# Patient Record
Sex: MALE | Race: BLACK OR AFRICAN AMERICAN | NOT HISPANIC OR LATINO | URBAN - METROPOLITAN AREA
[De-identification: names, ages, dates, MRNs, and addresses within clinical notes are randomized per-mention and may not be internally consistent; named-entity substitution may affect disease eponyms.]

---

## 2024-01-01 ENCOUNTER — APPOINTMENT (OUTPATIENT)
Dept: RADIOLOGY | Facility: HOSPITAL | Age: 69
End: 2024-01-01
Payer: MEDICARE

## 2024-01-01 ENCOUNTER — HOSPITAL ENCOUNTER (EMERGENCY)
Facility: HOSPITAL | Age: 69
End: 2024-09-16
Attending: STUDENT IN AN ORGANIZED HEALTH CARE EDUCATION/TRAINING PROGRAM
Payer: MEDICARE

## 2024-01-01 VITALS
RESPIRATION RATE: 20 BRPM | DIASTOLIC BLOOD PRESSURE: 36 MMHG | OXYGEN SATURATION: 41 % | WEIGHT: 112.43 LBS | HEART RATE: 118 BPM | SYSTOLIC BLOOD PRESSURE: 129 MMHG

## 2024-01-01 DIAGNOSIS — I46.9 CARDIAC ARREST: Primary | ICD-10-CM

## 2024-01-01 LAB
ABO GROUP (TYPE) IN BLOOD: NORMAL
ALBUMIN SERPL BCP-MCNC: 4 G/DL (ref 3.4–5)
ALP SERPL-CCNC: 89 U/L (ref 33–136)
ALT SERPL W P-5'-P-CCNC: 55 U/L (ref 10–52)
ANION GAP BLDV CALCULATED.4IONS-SCNC: 17 MMOL/L (ref 10–25)
ANION GAP BLDV CALCULATED.4IONS-SCNC: 26 MMOL/L (ref 10–25)
ANION GAP BLDV CALCULATED.4IONS-SCNC: 32 MMOL/L (ref 10–25)
ANION GAP SERPL CALC-SCNC: 38 MMOL/L (ref 10–20)
ANTIBODY SCREEN: NORMAL
AST SERPL W P-5'-P-CCNC: 70 U/L (ref 9–39)
BASE EXCESS BLDV CALC-SCNC: -14.1 MMOL/L (ref -2–3)
BASE EXCESS BLDV CALC-SCNC: -15.3 MMOL/L (ref -2–3)
BASE EXCESS BLDV CALC-SCNC: -23.1 MMOL/L (ref -2–3)
BASOPHILS # BLD AUTO: 0.01 X10*3/UL (ref 0–0.1)
BASOPHILS NFR BLD AUTO: 0.2 %
BILIRUB SERPL-MCNC: 1.1 MG/DL (ref 0–1.2)
BODY TEMPERATURE: 37 DEGREES CELSIUS
BUN SERPL-MCNC: 61 MG/DL (ref 6–23)
CA-I BLDV-SCNC: 0.75 MMOL/L (ref 1.1–1.33)
CA-I BLDV-SCNC: 1.06 MMOL/L (ref 1.1–1.33)
CA-I BLDV-SCNC: 1.79 MMOL/L (ref 1.1–1.33)
CALCIUM SERPL-MCNC: 10.4 MG/DL (ref 8.6–10.6)
CARDIAC TROPONIN I PNL SERPL HS: 435 NG/L (ref 0–53)
CHLORIDE BLDV-SCNC: 107 MMOL/L (ref 98–107)
CHLORIDE BLDV-SCNC: 112 MMOL/L (ref 98–107)
CHLORIDE BLDV-SCNC: 115 MMOL/L (ref 98–107)
CHLORIDE SERPL-SCNC: 112 MMOL/L (ref 98–107)
CO2 SERPL-SCNC: 4 MMOL/L (ref 21–32)
CREAT SERPL-MCNC: 2.28 MG/DL (ref 0.5–1.3)
EGFRCR SERPLBLD CKD-EPI 2021: 30 ML/MIN/1.73M*2
EOSINOPHIL # BLD AUTO: 0 X10*3/UL (ref 0–0.7)
EOSINOPHIL NFR BLD AUTO: 0 %
ERYTHROCYTE [DISTWIDTH] IN BLOOD BY AUTOMATED COUNT: 14.1 % (ref 11.5–14.5)
GLUCOSE BLDV-MCNC: 361 MG/DL (ref 74–99)
GLUCOSE BLDV-MCNC: 507 MG/DL (ref 74–99)
GLUCOSE BLDV-MCNC: 81 MG/DL (ref 74–99)
GLUCOSE SERPL-MCNC: 64 MG/DL (ref 74–99)
HCO3 BLDV-SCNC: 15.4 MMOL/L (ref 22–26)
HCO3 BLDV-SCNC: 4.4 MMOL/L (ref 22–26)
HCO3 BLDV-SCNC: 6.2 MMOL/L (ref 22–26)
HCT VFR BLD AUTO: 48.8 % (ref 41–52)
HCT VFR BLD EST: 28 % (ref 41–52)
HCT VFR BLD EST: 54 % (ref 41–52)
HCT VFR BLD EST: 63 % (ref 41–52)
HGB BLD-MCNC: 16.6 G/DL (ref 13.5–17.5)
HGB BLDV-MCNC: 18 G/DL (ref 13.5–17.5)
HGB BLDV-MCNC: 21 G/DL (ref 13.5–17.5)
HGB BLDV-MCNC: 9.4 G/DL (ref 13.5–17.5)
IMM GRANULOCYTES # BLD AUTO: 0.03 X10*3/UL (ref 0–0.7)
IMM GRANULOCYTES NFR BLD AUTO: 0.7 % (ref 0–0.9)
LACTATE BLDV-SCNC: 11.4 MMOL/L (ref 0.4–2)
LACTATE BLDV-SCNC: 11.5 MMOL/L (ref 0.4–2)
LACTATE BLDV-SCNC: 4 MMOL/L (ref 0.4–2)
LACTATE SERPL-SCNC: 12 MMOL/L (ref 0.4–2)
LYMPHOCYTES # BLD AUTO: 2.7 X10*3/UL (ref 1.2–4.8)
LYMPHOCYTES NFR BLD AUTO: 58.7 %
MAGNESIUM SERPL-MCNC: 3.25 MG/DL (ref 1.6–2.4)
MCH RBC QN AUTO: 32 PG (ref 26–34)
MCHC RBC AUTO-ENTMCNC: 34 G/DL (ref 32–36)
MCV RBC AUTO: 94 FL (ref 80–100)
MONOCYTES # BLD AUTO: 0.2 X10*3/UL (ref 0.1–1)
MONOCYTES NFR BLD AUTO: 4.3 %
NEUTROPHILS # BLD AUTO: 1.66 X10*3/UL (ref 1.2–7.7)
NEUTROPHILS NFR BLD AUTO: 36.1 %
NRBC BLD-RTO: 0.7 /100 WBCS (ref 0–0)
OXYHGB MFR BLDV: 27.8 % (ref 45–75)
OXYHGB MFR BLDV: 79.7 % (ref 45–75)
OXYHGB MFR BLDV: 97.4 % (ref 45–75)
PCO2 BLDV: 24 MM HG (ref 41–51)
PCO2 BLDV: 53 MM HG (ref 41–51)
PCO2 BLDV: 7 MM HG (ref 41–51)
PH BLDV: 7.02 PH (ref 7.33–7.43)
PH BLDV: 7.07 PH (ref 7.33–7.43)
PH BLDV: 7.41 PH (ref 7.33–7.43)
PLATELET # BLD AUTO: 99 X10*3/UL (ref 150–450)
PO2 BLDV: 249 MM HG (ref 35–45)
PO2 BLDV: 36 MM HG (ref 35–45)
PO2 BLDV: 66 MM HG (ref 35–45)
POTASSIUM BLDV-SCNC: 2.8 MMOL/L (ref 3.5–5.3)
POTASSIUM BLDV-SCNC: 4.9 MMOL/L (ref 3.5–5.3)
POTASSIUM BLDV-SCNC: 7.1 MMOL/L (ref 3.5–5.3)
POTASSIUM SERPL-SCNC: 6.9 MMOL/L (ref 3.5–5.3)
PROT SERPL-MCNC: 7.1 G/DL (ref 6.4–8.2)
RBC # BLD AUTO: 5.18 X10*6/UL (ref 4.5–5.9)
RH FACTOR (ANTIGEN D): NORMAL
SAO2 % BLDV: 100 % (ref 45–75)
SAO2 % BLDV: 28 % (ref 45–75)
SAO2 % BLDV: 82 % (ref 45–75)
SODIUM BLDV-SCNC: 135 MMOL/L (ref 136–145)
SODIUM BLDV-SCNC: 141 MMOL/L (ref 136–145)
SODIUM BLDV-SCNC: 143 MMOL/L (ref 136–145)
SODIUM SERPL-SCNC: 147 MMOL/L (ref 136–145)
WBC # BLD AUTO: 4.6 X10*3/UL (ref 4.4–11.3)

## 2024-01-01 PROCEDURE — 71045 X-RAY EXAM CHEST 1 VIEW: CPT | Performed by: RADIOLOGY

## 2024-01-01 PROCEDURE — 36556 INSERT NON-TUNNEL CV CATH: CPT

## 2024-01-01 PROCEDURE — 36556 INSERT NON-TUNNEL CV CATH: CPT | Performed by: STUDENT IN AN ORGANIZED HEALTH CARE EDUCATION/TRAINING PROGRAM

## 2024-01-01 PROCEDURE — 71045 X-RAY EXAM CHEST 1 VIEW: CPT

## 2024-01-01 PROCEDURE — 83605 ASSAY OF LACTIC ACID: CPT | Performed by: STUDENT IN AN ORGANIZED HEALTH CARE EDUCATION/TRAINING PROGRAM

## 2024-01-01 PROCEDURE — 93010 ELECTROCARDIOGRAM REPORT: CPT | Performed by: STUDENT IN AN ORGANIZED HEALTH CARE EDUCATION/TRAINING PROGRAM

## 2024-01-01 PROCEDURE — 36620 INSERTION CATHETER ARTERY: CPT

## 2024-01-01 PROCEDURE — 2500000004 HC RX 250 GENERAL PHARMACY W/ HCPCS (ALT 636 FOR OP/ED)

## 2024-01-01 PROCEDURE — 85025 COMPLETE CBC W/AUTO DIFF WBC: CPT | Performed by: STUDENT IN AN ORGANIZED HEALTH CARE EDUCATION/TRAINING PROGRAM

## 2024-01-01 PROCEDURE — 84132 ASSAY OF SERUM POTASSIUM: CPT

## 2024-01-01 PROCEDURE — 84132 ASSAY OF SERUM POTASSIUM: CPT | Performed by: STUDENT IN AN ORGANIZED HEALTH CARE EDUCATION/TRAINING PROGRAM

## 2024-01-01 PROCEDURE — 94002 VENT MGMT INPAT INIT DAY: CPT | Mod: CCI

## 2024-01-01 PROCEDURE — 36415 COLL VENOUS BLD VENIPUNCTURE: CPT | Performed by: STUDENT IN AN ORGANIZED HEALTH CARE EDUCATION/TRAINING PROGRAM

## 2024-01-01 PROCEDURE — 86901 BLOOD TYPING SEROLOGIC RH(D): CPT | Performed by: STUDENT IN AN ORGANIZED HEALTH CARE EDUCATION/TRAINING PROGRAM

## 2024-01-01 PROCEDURE — 2500000002 HC RX 250 W HCPCS SELF ADMINISTERED DRUGS (ALT 637 FOR MEDICARE OP, ALT 636 FOR OP/ED)

## 2024-01-01 PROCEDURE — 99291 CRITICAL CARE FIRST HOUR: CPT | Performed by: STUDENT IN AN ORGANIZED HEALTH CARE EDUCATION/TRAINING PROGRAM

## 2024-01-01 PROCEDURE — 2500000004 HC RX 250 GENERAL PHARMACY W/ HCPCS (ALT 636 FOR OP/ED): Performed by: STUDENT IN AN ORGANIZED HEALTH CARE EDUCATION/TRAINING PROGRAM

## 2024-01-01 PROCEDURE — 31500 INSERT EMERGENCY AIRWAY: CPT | Performed by: STUDENT IN AN ORGANIZED HEALTH CARE EDUCATION/TRAINING PROGRAM

## 2024-01-01 PROCEDURE — 92950 HEART/LUNG RESUSCITATION CPR: CPT

## 2024-01-01 PROCEDURE — 31500 INSERT EMERGENCY AIRWAY: CPT

## 2024-01-01 PROCEDURE — 84484 ASSAY OF TROPONIN QUANT: CPT | Performed by: STUDENT IN AN ORGANIZED HEALTH CARE EDUCATION/TRAINING PROGRAM

## 2024-01-01 PROCEDURE — 99285 EMERGENCY DEPT VISIT HI MDM: CPT | Performed by: STUDENT IN AN ORGANIZED HEALTH CARE EDUCATION/TRAINING PROGRAM

## 2024-01-01 PROCEDURE — 83735 ASSAY OF MAGNESIUM: CPT | Performed by: STUDENT IN AN ORGANIZED HEALTH CARE EDUCATION/TRAINING PROGRAM

## 2024-01-01 PROCEDURE — 2500000005 HC RX 250 GENERAL PHARMACY W/O HCPCS: Performed by: STUDENT IN AN ORGANIZED HEALTH CARE EDUCATION/TRAINING PROGRAM

## 2024-01-01 PROCEDURE — 92950 HEART/LUNG RESUSCITATION CPR: CPT | Performed by: STUDENT IN AN ORGANIZED HEALTH CARE EDUCATION/TRAINING PROGRAM

## 2024-01-01 PROCEDURE — 36680 INSERT NEEDLE BONE CAVITY: CPT

## 2024-01-01 PROCEDURE — 2500000005 HC RX 250 GENERAL PHARMACY W/O HCPCS

## 2024-01-01 RX ORDER — NOREPINEPHRINE BITARTRATE/D5W 8 MG/250ML
.01-1.5 PLASTIC BAG, INJECTION (ML) INTRAVENOUS CONTINUOUS
Status: DISCONTINUED | OUTPATIENT
Start: 2024-01-01 | End: 2024-01-01 | Stop reason: HOSPADM

## 2024-01-01 RX ORDER — FENTANYL CITRATE-0.9 % NACL/PF 10 MCG/ML
PLASTIC BAG, INJECTION (ML) INTRAVENOUS
Status: COMPLETED
Start: 2024-01-01 | End: 2024-01-01

## 2024-01-01 RX ORDER — EPINEPHRINE 0.1 MG/ML
INJECTION INTRACARDIAC; INTRAVENOUS CODE/TRAUMA/SEDATION MEDICATION
Status: COMPLETED | OUTPATIENT
Start: 2024-01-01 | End: 2024-01-01

## 2024-01-01 RX ORDER — AMIODARONE HYDROCHLORIDE 150 MG/3ML
INJECTION, SOLUTION INTRAVENOUS CODE/TRAUMA/SEDATION MEDICATION
Status: COMPLETED | OUTPATIENT
Start: 2024-01-01 | End: 2024-01-01

## 2024-01-01 RX ORDER — CALCIUM CHLORIDE INJECTION 100 MG/ML
INJECTION, SOLUTION INTRAVENOUS CODE/TRAUMA/SEDATION MEDICATION
Status: COMPLETED | OUTPATIENT
Start: 2024-01-01 | End: 2024-01-01

## 2024-01-01 RX ORDER — MIDAZOLAM HYDROCHLORIDE 1 MG/ML
INJECTION INTRAMUSCULAR; INTRAVENOUS
Status: COMPLETED
Start: 2024-01-01 | End: 2024-01-01

## 2024-01-01 RX ORDER — ATROPINE SULFATE 0.4 MG/ML
1 INJECTION, SOLUTION ENDOTRACHEAL; INTRAMEDULLARY; INTRAMUSCULAR; INTRAVENOUS; SUBCUTANEOUS ONCE
Status: COMPLETED | OUTPATIENT
Start: 2024-01-01 | End: 2024-01-01

## 2024-01-01 RX ORDER — INDOMETHACIN 25 MG/1
CAPSULE ORAL CODE/TRAUMA/SEDATION MEDICATION
Status: COMPLETED | OUTPATIENT
Start: 2024-01-01 | End: 2024-01-01

## 2024-01-01 RX ORDER — DEXTROSE 50 % IN WATER (D50W) INTRAVENOUS SYRINGE
CODE/TRAUMA/SEDATION MEDICATION
Status: COMPLETED | OUTPATIENT
Start: 2024-01-01 | End: 2024-01-01

## 2024-01-01 RX ORDER — MAGNESIUM SULFATE HEPTAHYDRATE 40 MG/ML
2 INJECTION, SOLUTION INTRAVENOUS ONCE
Status: COMPLETED | OUTPATIENT
Start: 2024-01-01 | End: 2024-01-01

## 2024-01-01 RX ORDER — MIDAZOLAM HYDROCHLORIDE 1 MG/ML
2 INJECTION INTRAMUSCULAR; INTRAVENOUS EVERY 2 HOUR PRN
Status: DISCONTINUED | OUTPATIENT
Start: 2024-01-01 | End: 2024-01-01 | Stop reason: HOSPADM

## 2024-01-01 RX ORDER — FENTANYL CITRATE-0.9 % NACL/PF 10 MCG/ML
0-300 PLASTIC BAG, INJECTION (ML) INTRAVENOUS CONTINUOUS
Status: DISCONTINUED | OUTPATIENT
Start: 2024-01-01 | End: 2024-01-01 | Stop reason: HOSPADM

## 2024-09-16 NOTE — ED PROVIDER NOTES
Emergency Department Provider Note        History of Present Illness   History provided by: EMS  Limitations to History: Cardiac Arrest    HPI:  Duane Harlston is a 69 y.o. male who is brought in by EMS in respiratory distress. Bag valve mask was applied on arrival. Patient was found to be unresponsive by EMS. Bag valve mask was applied. They were unable to obtain a blood pressure. Patient was transitioned to our monitors. Patient had an IO with 1 L of IVF hanging.     Physical Exam   General: unconscious, nonresponsive to noxious stimuli  Eyes: Pupils 3-2  HENT: Normo-cephalic, atraumatic.  CV: no pulse on arrival  Resp: BVM with agonal breathing.  GI: Soft, no bruising  MSK/Extremities: No gross bony deformities.  Skin: Dry  Neuro: GCS 3. Patient is unresponsive with no spontaneous movements.    Medical Decision Making & ED Course   Medical Decision Makin y.o. male presenting to the ED in respiratory distress. Patient was transitioned to our monitors. Patient had no pulse when transitioned to our bed. CPR was intiated. Patient was connected to our pads. Patient had 1 IO in place L tibia from EMS. ACLS was started. Patient's initial rhythm was in v fib. I gel was placed. Patient was transitioned to a lucus for cpr. Patient was defibrillated. Patient was given amiodarone and epinephrine per ACLS recommendations. Patient was intubated by ED resident. Patient was found to have a metabolic acidosis with a CO2 of 7 on the VBG. Patient was hyperkalemic. Patient was given calcium gluconate, insulin/ D50. Bedside poc us was performed on arrival which did not show a pericardial effusion. ROSC was obtained after about 29 minutes.    Second IO access was obtained. Patient was started on levophed for post arrest care. A line was attempted. A US IV was obtained. Patient became bradycardic. EKG was obtained which showed a right ventricular block, ST segment depressions in the anterior leads (cf posterior MI). Attempts were  made to page the CICU attending given v fib arrest. Believed the arrest was secondary to hyperkalemia, respiratory arrest or ACS. Patient was started on versed and fentanyl for breathing over the ventilator. Patient went into a pea arrest.     CPR was initiated. Patient was given additional doses of epinephrine. RoSC was obtained. Patient was bradycardic. Atropine was given. Patient was on maximum levophed and epinephrine gtt. Patient was started on vasopressin. Patient was given a push dose of phenelephrine.  Attempts were continued to get a central and arterial line access. ROSC was obtained a third time. Attempts were continued for additional access. Cardiology fellow was at bedside and repeat EKG showed concern for a ischmeic ECG. However, patient lost pulses again.     CPR was initiated. Patient was in a PEA arrest.     During the code, patient's family was in the ED. Goals of care conversation were had with the family. Family was notified. Code efforts were continued until the family was at bedside. Time of death was called at 13:09.       See ED CODE timeline for more detailed report of ACLS      ED Course:       Disposition       Procedures   Procedures    Patient seen and discussed with ED attending physician.    Susu Vasquez MD  Emergency Medicine      Susu Vasquez MD  Resident  24 [phone]       Rafael Christopher MD  24 [phone]

## 2024-09-16 NOTE — ED PROCEDURE NOTE
Procedure  Intubation    Performed by: Chiquita Berman MD  Authorized by: Rafael Christopher MD    Consent:     Consent obtained:  Verbal    Consent given by:  Healthcare agent    Risks discussed:  Hypoxia    Alternatives discussed:  No treatment  Universal protocol:     Procedure explained and questions answered to patient or proxy's satisfaction: yes      Patient identity confirmed:  Anonymous protocol, patient vented/unresponsive  Pre-procedure details:     Indications: airway protection, altered consciousness, cardio/pulmonary arrest and respiratory distress      Patient status:  Unresponsive    Look externally: no concerns      Mouth opening - incisor distance:  3 or more finger widths    Hyoid-mental distance: 3 or more finger widths      Hyoid-thyroid distance: 2 or more finger widths      Mallampati score:  I    Obstruction: none      Neck mobility: normal      Pharmacologic strategy: none      Induction agents:  None    Paralytics:  None  Procedure details:     Preoxygenation:  Bag valve mask    CPR in progress: yes      Number of attempts:  1  Successful intubation attempt details:     Intubation method:  Oral    Intubation technique: video assisted      Laryngoscope blade:  Hypercurved    Bougie used: no      Grade view: I      Tube size (mm):  7.5    Tube type:  Cuffed    Tube visualized through cords: yes    Placement assessment:     ETT at teeth/gumline (cm):  22    Tube secured with:  ETT borrero    Breath sounds:  Equal    Placement verification: CXR verification, direct visualization and equal breath sounds      CXR findings:  Appropriate position  Post-procedure details:     Procedure completion:  Tolerated    Complications: cardiac arrest, cardiac dysrhythmia and hypoxia                 Chiquita Berman MD  Resident  09/16/24 8577

## 2024-09-16 NOTE — ED PROCEDURE NOTE
Procedure  Critical Care    Performed by: Rafael Christopher MD  Authorized by: Rafael Christopher MD    Critical care provider statement:     Critical care time (minutes):  160    Critical care time was exclusive of:  Separately billable procedures and treating other patients and teaching time    Critical care was necessary to treat or prevent imminent or life-threatening deterioration of the following conditions:  Circulatory failure    Critical care was time spent personally by me on the following activities:  Blood draw for specimens, ordering and review of laboratory studies, ordering and performing treatments and interventions, ordering and review of radiographic studies, pulse oximetry, re-evaluation of patient's condition, ventilator management, development of treatment plan with patient or surrogate and examination of patient  Comments:      Respiratory arrest requiring intubation.  Multiple rounds of CPR with ROSC in between.  Required vasopressors               Rafael Christopher MD  09/16/24 5226

## 2024-09-16 NOTE — ED PROCEDURE NOTE
Procedure  Insert arterial line    Performed by: Armando Oleary MD  Authorized by: Rafael Christopher MD    Consent:     Consent obtained:  Emergent situation  Universal protocol:     Patient identity confirmed:  Arm band  Indications:     Indications: hemodynamic monitoring    Pre-procedure details:     Skin preparation:  Chlorhexidine  Sedation:     Sedation type:  None  Anesthesia:     Anesthesia method:  None  Procedure details:     Location:  R femoral    Needle gauge:  18 G    Placement technique:  Seldinger and ultrasound guided    Number of attempts:  4  Post-procedure details:     Procedure completion:  Procedure terminated due to patient's clinical status  Comments:      Both L and R femoral access was attempted but unsuccessful due to patient anatomy, terminated once patient arrested multiple times and TOD was called               Armando Oleary MD  Resident  09/16/24 1320       Armando Oleary MD  Resident  09/16/24 3141

## 2024-09-16 NOTE — ED TRIAGE NOTES
Patient presented to the ED in respiratory distress. Per family, patient had been feeling unwell for the past couple of days but had been refusing to come to the ED. Per EMS, initial HR had been 150s and subsequently desire down to 80. RR had been 32 then dropped to 8. Arrived to the ED with BVM.  for EMS. IO placed in L tibia with NS bolus infusing.     Patient arrived to the ED at 1106 and pulse was lost/CPR initiated at 1108.    A total of 4 cycles of CPR performed (patient had obtained ROSC a total of 3 times). TOD called at 1309 by Dr. Susu Vasquez with family at bedside.

## 2024-09-16 NOTE — ED PROCEDURE NOTE
Procedure  Central Line    Performed by: Armando Oleary MD  Authorized by: Rafael Christopher MD    Consent:     Consent obtained:  Emergent situation  Pre-procedure details:     Indication(s): central venous access      Hand hygiene: Hand hygiene performed prior to insertion      Skin preparation:  Chlorhexidine  Sedation:     Sedation type:  None  Anesthesia:     Anesthesia method:  None  Procedure details:     Location:  L femoral    Patient position:  Supine    Procedural supplies:  Triple lumen    Catheter size:  7 Fr    Landmarks identified: no      Ultrasound guidance: yes      Ultrasound guidance timing: prior to insertion and real time      Sterile ultrasound techniques: Sterile gel and sterile probe covers were used      Number of attempts:  1    Successful placement: yes    Post-procedure details:     Post-procedure:  Dressing applied and line sutured    Assessment:  Blood return through all ports    Procedure completion:  Tolerated               Armando Oleary MD  Resident  09/16/24 1318       Armando Oleary MD  Resident  09/16/24 1433